# Patient Record
Sex: MALE | Race: WHITE | NOT HISPANIC OR LATINO
[De-identification: names, ages, dates, MRNs, and addresses within clinical notes are randomized per-mention and may not be internally consistent; named-entity substitution may affect disease eponyms.]

---

## 2018-07-24 PROBLEM — Z00.00 ENCOUNTER FOR PREVENTIVE HEALTH EXAMINATION: Status: ACTIVE | Noted: 2018-07-24

## 2018-07-30 ENCOUNTER — APPOINTMENT (OUTPATIENT)
Dept: OPHTHALMOLOGY | Facility: CLINIC | Age: 78
End: 2018-07-30
Payer: MEDICARE

## 2018-07-30 DIAGNOSIS — H25.13 AGE-RELATED NUCLEAR CATARACT, BILATERAL: ICD-10-CM

## 2018-07-30 PROCEDURE — 92014 COMPRE OPH EXAM EST PT 1/>: CPT

## 2023-12-14 ENCOUNTER — NON-APPOINTMENT (OUTPATIENT)
Age: 83
End: 2023-12-14

## 2023-12-14 ENCOUNTER — APPOINTMENT (OUTPATIENT)
Dept: OPHTHALMOLOGY | Facility: CLINIC | Age: 83
End: 2023-12-14
Payer: MEDICARE

## 2023-12-14 PROCEDURE — 92004 COMPRE OPH EXAM NEW PT 1/>: CPT

## 2023-12-14 PROCEDURE — 92134 CPTRZ OPH DX IMG PST SGM RTA: CPT

## 2023-12-14 PROCEDURE — 92136 OPHTHALMIC BIOMETRY: CPT

## 2024-01-17 RX ORDER — SODIUM CHLORIDE 9 MG/ML
1000 INJECTION, SOLUTION INTRAVENOUS
Refills: 0 | Status: DISCONTINUED | OUTPATIENT
Start: 2024-01-23 | End: 2024-01-23

## 2024-01-22 NOTE — ASU PATIENT PROFILE, ADULT - FALL HARM RISK - UNIVERSAL INTERVENTIONS
Bed in lowest position, wheels locked, appropriate side rails in place/Call bell, personal items and telephone in reach/Instruct patient to call for assistance before getting out of bed or chair/Non-slip footwear when patient is out of bed/[place] to call system/Physically safe environment - no spills, clutter or unnecessary equipment/Purposeful Proactive Rounding/Room/bathroom lighting operational, light cord in reach

## 2024-01-23 ENCOUNTER — NON-APPOINTMENT (OUTPATIENT)
Age: 84
End: 2024-01-23

## 2024-01-23 ENCOUNTER — OUTPATIENT (OUTPATIENT)
Dept: OUTPATIENT SERVICES | Facility: HOSPITAL | Age: 84
LOS: 1 days | Discharge: HOME | End: 2024-01-23

## 2024-01-23 ENCOUNTER — APPOINTMENT (OUTPATIENT)
Dept: OPHTHALMOLOGY | Facility: AMBULATORY SURGERY CENTER | Age: 84
End: 2024-01-23
Payer: MEDICARE

## 2024-01-23 ENCOUNTER — TRANSCRIPTION ENCOUNTER (OUTPATIENT)
Age: 84
End: 2024-01-23

## 2024-01-23 VITALS
TEMPERATURE: 98 F | HEIGHT: 70.5 IN | WEIGHT: 175.05 LBS | OXYGEN SATURATION: 99 % | DIASTOLIC BLOOD PRESSURE: 76 MMHG | SYSTOLIC BLOOD PRESSURE: 142 MMHG | HEART RATE: 87 BPM | RESPIRATION RATE: 16 BRPM

## 2024-01-23 VITALS
RESPIRATION RATE: 16 BRPM | DIASTOLIC BLOOD PRESSURE: 56 MMHG | OXYGEN SATURATION: 100 % | HEART RATE: 74 BPM | SYSTOLIC BLOOD PRESSURE: 130 MMHG | TEMPERATURE: 97 F

## 2024-01-23 PROCEDURE — 66984 XCAPSL CTRC RMVL W/O ECP: CPT | Mod: LT

## 2024-01-23 DEVICE — LENS IOL TECNIS SMPLCTY 1PC CLR MONO DCB0000 20.0D
Type: IMPLANTABLE DEVICE | Site: LEFT | Status: NON-FUNCTIONAL
Removed: 2024-01-23

## 2024-01-23 RX ORDER — ONDANSETRON 8 MG/1
4 TABLET, FILM COATED ORAL ONCE
Refills: 0 | Status: DISCONTINUED | OUTPATIENT
Start: 2024-01-23 | End: 2024-01-23

## 2024-01-23 RX ORDER — PHENYLEPHRINE HCL 2.5 %
1 DROPS OPHTHALMIC (EYE)
Refills: 0 | Status: COMPLETED | OUTPATIENT
Start: 2024-01-23 | End: 2024-01-23

## 2024-01-23 RX ORDER — ACETAMINOPHEN 500 MG
650 TABLET ORAL ONCE
Refills: 0 | Status: DISCONTINUED | OUTPATIENT
Start: 2024-01-23 | End: 2024-01-23

## 2024-01-23 RX ORDER — OFLOXACIN 0.3 %
1 DROPS OPHTHALMIC (EYE)
Refills: 0 | Status: COMPLETED | OUTPATIENT
Start: 2024-01-23 | End: 2024-01-23

## 2024-01-23 RX ADMIN — Medication 1 DROP(S): at 11:36

## 2024-01-23 RX ADMIN — Medication 1 DROP(S): at 11:46

## 2024-01-23 RX ADMIN — Medication 1 DROP(S): at 11:41

## 2024-01-23 NOTE — OPERATIVE REPORT - OPERATIVE RPOSRT DETAILS
SURGEON: Mana Hugo    ASSISTANT: none    PRE-OP DIAGNOSIS: cataract OS    POST-OP DIAGNOSIS: Same    ANESTHESIA: MAC, local    PROCEDURE: cataract extraction with intraocular lens placement, left eye    SPECIMEN/TISSUE REMOVED: None    ESTIMATED BLOOD LOSS: < 1mL    COMPLICATIONS: None    PROCEDURE:    The patient was seen in the preoperative area. The risks, benefits, and alternatives to surgery were discussed. All questions were answered.  The patient was given standard preoperative drops. The patient was then brought to the operating room and prepped and draped in the usual sterile fashion for ophthalmic surgery.    A lid speculum was used to expose the eye.  A paracentesis was created with an MVR blade at 4 o'clock hour from the temporal limbus in the clockwise direction.  Next, 1% Preservative-free Lidocaine was instilled into the anterior chamber.  Due to a poor red reflex, Tripan Blue was used to stain the anterior capsule.  Tripan Blue was washed out using balanced salt solution and the anterior chamber was filled with Healon.  A clear corneal incision was created with the aid of a crescent blade and a 2.4 mm sharped tip keratome at the 2 o'clock position.  A cystotome and Utrata forceps was used to create a continuous curvilinear capsulorrhexis.  Balanced salt solution was used for hydro dissection.  The nucleus was then phacoemulsified and aspirated using a divide and conquer technique.  The remaining epinucleus and cortical material was aspirated from the eye.  The capsular bag was then reformed using provisc in anticipation of the introduction of an intraocular lens implant.  The implant which was a DCBOO +20.0D SN [id number] was injected into the capsular bag.  After centration, the remaining viscoelastic material was removed using the IA handpiece.    The temporal clear corneal and paracentesis incisions were hydrated with balanced salt solution to achieve adequate watertight closure. The wounds were checked for leaks and found to be negative.    An antibiotic was instilled into the eye, and the eye was shielded.    The patient tolerated the procedure well and was transferred to the recovery room in stable condition. They received standard postoperative instructions and an appointment to follow up the next day.

## 2024-01-24 ENCOUNTER — APPOINTMENT (OUTPATIENT)
Dept: OPHTHALMOLOGY | Facility: CLINIC | Age: 84
End: 2024-01-24
Payer: MEDICARE

## 2024-01-24 ENCOUNTER — NON-APPOINTMENT (OUTPATIENT)
Age: 84
End: 2024-01-24

## 2024-01-24 PROCEDURE — 99024 POSTOP FOLLOW-UP VISIT: CPT

## 2024-02-08 ENCOUNTER — NON-APPOINTMENT (OUTPATIENT)
Age: 84
End: 2024-02-08

## 2024-02-08 ENCOUNTER — APPOINTMENT (OUTPATIENT)
Dept: OPHTHALMOLOGY | Facility: CLINIC | Age: 84
End: 2024-02-08
Payer: MEDICARE

## 2024-02-08 PROCEDURE — 99024 POSTOP FOLLOW-UP VISIT: CPT

## 2024-03-12 PROBLEM — I48.91 UNSPECIFIED ATRIAL FIBRILLATION: Chronic | Status: ACTIVE | Noted: 2024-01-23

## 2024-05-13 ENCOUNTER — NON-APPOINTMENT (OUTPATIENT)
Age: 84
End: 2024-05-13

## 2024-05-13 ENCOUNTER — APPOINTMENT (OUTPATIENT)
Dept: OPHTHALMOLOGY | Facility: CLINIC | Age: 84
End: 2024-05-13
Payer: MEDICARE

## 2024-05-13 PROCEDURE — 92014 COMPRE OPH EXAM EST PT 1/>: CPT

## 2024-05-22 ENCOUNTER — APPOINTMENT (OUTPATIENT)
Dept: OPHTHALMOLOGY | Facility: CLINIC | Age: 84
End: 2024-05-22
Payer: MEDICARE

## 2024-05-22 ENCOUNTER — NON-APPOINTMENT (OUTPATIENT)
Age: 84
End: 2024-05-22

## 2024-05-22 PROCEDURE — 92012 INTRM OPH EXAM EST PATIENT: CPT

## 2025-04-29 ENCOUNTER — APPOINTMENT (OUTPATIENT)
Dept: OPHTHALMOLOGY | Facility: CLINIC | Age: 85
End: 2025-04-29
Payer: MEDICARE

## 2025-04-29 ENCOUNTER — NON-APPOINTMENT (OUTPATIENT)
Age: 85
End: 2025-04-29

## 2025-04-29 PROCEDURE — 66821 AFTER CATARACT LASER SURGERY: CPT | Mod: RT

## 2025-04-29 PROCEDURE — 92014 COMPRE OPH EXAM EST PT 1/>: CPT | Mod: 57

## 2025-05-29 NOTE — ASU PREOP CHECKLIST - NS PREOP CHK [place] WASH
[Time Spent: ___ minutes] : I have spent [unfilled] minutes of time on the encounter which excludes teaching and separately reported services. N/A

## (undated) DEVICE — GLV 6.5 PROTEXIS (WHITE)

## (undated) DEVICE — SOL IRR BAG BSS 500ML

## (undated) DEVICE — PACK CENTURION 2.4MM

## (undated) DEVICE — NUCLEUS HYDRODISSECTOR PEARCE ANGLED 25G X 22MM

## (undated) DEVICE — PACK ANTERIOR SEGMENT

## (undated) DEVICE — DRAPE MICROSCOPE KNOB COVER SMALL (2 PCS)

## (undated) DEVICE — TRANSFORMER INTREPID I/A 0.3MM

## (undated) DEVICE — APPLICATOR COTTON TIP 3" STERILE

## (undated) DEVICE — SUT NYLON 10-0 12" CU-5

## (undated) DEVICE — KIT CENTURION ANTERIOR

## (undated) DEVICE — Device

## (undated) DEVICE — KNIFE ALCON MVR V-LANCE 20G (WHITE)